# Patient Record
Sex: MALE | Race: WHITE
[De-identification: names, ages, dates, MRNs, and addresses within clinical notes are randomized per-mention and may not be internally consistent; named-entity substitution may affect disease eponyms.]

---

## 2017-03-14 ENCOUNTER — HOSPITAL ENCOUNTER (OUTPATIENT)
Dept: HOSPITAL 25 - OREAST | Age: 62
Discharge: HOME | End: 2017-03-14
Attending: OPHTHALMOLOGY
Payer: COMMERCIAL

## 2017-03-14 VITALS — DIASTOLIC BLOOD PRESSURE: 81 MMHG | SYSTOLIC BLOOD PRESSURE: 121 MMHG

## 2017-03-14 DIAGNOSIS — E78.4: ICD-10-CM

## 2017-03-14 DIAGNOSIS — K21.9: ICD-10-CM

## 2017-03-14 DIAGNOSIS — E04.9: ICD-10-CM

## 2017-03-14 DIAGNOSIS — F17.290: ICD-10-CM

## 2017-03-14 DIAGNOSIS — Z86.73: ICD-10-CM

## 2017-03-14 DIAGNOSIS — N40.0: ICD-10-CM

## 2017-03-14 DIAGNOSIS — G47.33: ICD-10-CM

## 2017-03-14 DIAGNOSIS — Z88.8: ICD-10-CM

## 2017-03-14 DIAGNOSIS — F52.21: ICD-10-CM

## 2017-03-14 DIAGNOSIS — L57.0: ICD-10-CM

## 2017-03-14 DIAGNOSIS — H25.13: Primary | ICD-10-CM

## 2017-03-14 DIAGNOSIS — H25.041: ICD-10-CM

## 2017-03-14 PROCEDURE — V2632 POST CHMBR INTRAOCULAR LENS: HCPCS

## 2017-03-14 NOTE — OP
DATE OF OPERATION:  03/14/17 [facility]

 

DATE OF BIRTH:  [birth year]

 

SURGEON:  Dr. Kwaku Rosales.

 

ASSISTANT:  None.



ANESTHESIOLOGIST:  Aaron Hernández MD

 

ANESTHESIA:  Topical with intravenous sedation.

 

PRE-OP DIAGNOSIS:  Cataract, right eye.

 

POST-OP DIAGNOSIS:  Cataract, right eye.

 

OPERATIVE PROCEDURE:  Phacoemulsification and cataract extraction with 
posterior chamber intraocular lens implant, right eye.

 

COMPLICATIONS:  None.

 

BLOOD LOSS:  None.

 

DESCRIPTION OF PROCEDURE:  The patient was brought to the operating room and 
received a small amount of intravenous sedation.  A drop of tetracaine was 
placed in his right eye.  He was prepped and draped in the usual sterile 
fashion for ophthalmic surgery and attention was directed to the right eye 
where a speculum was placed.  A paracentesis was created at the 11 o'clock 
position and 0.1 cc of 1 percent preservative-free lidocaine was injected into 
the anterior chamber followed by DisCoVisc. The eye was digitally stabilized 
while a 2.75 mm keratome was used to create a triplanar clear corneal incision 
at the 9 o'clock position.  A continuous curvilinear capsulorrhexis was created 
with a cystotome and Utrata forceps.  BSS on a cannula was used to hydrodissect 
the lens from the capsule. Phacoemulsification was performed in a divide-and-
conquer technique to create four fragments which were removed.  Residual 
cortical material was removed with irrigation and aspiration.  DisCoVisc was 
used to inflate the capsular bag and an AU00T0 19.5 diopter lens was folded and 
inserted into the capsular bag.  DisCoVisc was removed using irrigation and 
aspiration.  BSS on a cannula was used to hydrate the corneal stroma and seal 
the wound.  At the end of the case the pupil was round and the lens was 
centered.  The eye was of normal pressure and the wound was water tight.  The 
speculum was removed and topical Maxitrol ointment was placed on the surface of 
the eye.  The eye was closed, patched and shielded and the patient was sent to 
the recovery room in stable condition with post operative instructions and 
follow-up appointment given.

 

 [id number]/[id number] #: [id number]

MTDD

## 2018-10-03 ENCOUNTER — HOSPITAL ENCOUNTER (EMERGENCY)
Dept: HOSPITAL 25 - UCEAST | Age: 63
Discharge: HOME | End: 2018-10-03
Payer: COMMERCIAL

## 2018-10-03 VITALS — SYSTOLIC BLOOD PRESSURE: 132 MMHG | DIASTOLIC BLOOD PRESSURE: 82 MMHG

## 2018-10-03 DIAGNOSIS — Z87.891: ICD-10-CM

## 2018-10-03 DIAGNOSIS — S63.591A: Primary | ICD-10-CM
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
## 2018-10-03 DIAGNOSIS — Z88.8: ICD-10-CM
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
## 2018-10-03 PROCEDURE — 99212 OFFICE O/P EST SF 10 MIN: CPT

## 2018-10-03 PROCEDURE — G0463 HOSPITAL OUTPT CLINIC VISIT: HCPCS

## 2018-10-03 NOTE — RAD
INDICATION:  Right wrist injury.



TECHNIQUE: 3 views of the right wrist were obtained.



FINDINGS:  There is widening of the scapholunate joint space. No fracture is seen. There

is slight dorsal tilting of the lunate bone.



IMPRESSION:  

1. NO FRACTURE IS SEEN.

2. THERE IS WIDENING OF THE SCAPHOLUNATE JOINT SPACE SUGGESTIVE OF A SCAPHOLUNATE LIGAMENT

TEAR. THIS COULD BE FURTHER EVALUATED WITH AN MRI OF THE WRIST.

## 2018-10-03 NOTE — UC
Hand/Wrist HPI





- HPI Summary


HPI Summary: 





The patient is a 63-year-old male who fell and injured his right wrist 

yesterday.  He is right hand dominant.  He has never injured his right wrist in 

the past.  He has minimal pain at rest.  His pain markedly increases with 

dorsiflexion of the wrist.





- History Of Current Complaint


Chief Complaint: UCUpperExtremity


Stated Complaint: WRIST INJURY


Time Seen by Provider: 10/03/18 09:42


Hx Obtained From: Patient


Onset/Duration: Sudden Onset, Lasting Hours


Severity Initially: Severe


Severity Currently: Mild


Pain Intensity: 3 - 8 with movement


Pain Scale Used: 0-10 Numeric


Character Of Pain: Aching


Aggravating Factor(s): Movement


Alleviating Factor(s): Rest


Associated Signs And Symptoms: Positive: Swelling


Related History: Dominant Hand Right


Hands: 


  __________________________














  __________________________





 1 - tender/swollen, no snuff box tenderness








- Allergies/Home Medications


Allergies/Adverse Reactions: 


 Allergies











Allergy/AdvReac Type Severity Reaction Status Date / Time


 


phenobarbital AdvReac Mild "frozen Verified 10/03/18 08:59





   shoulder  





   syndrome"  











Home Medications: 


 Home Medications





NK [No Home Medications Reported]  10/03/18 [History Confirmed 10/03/18]











PMH/Surg Hx/FS Hx/Imm Hx


Previously Healthy: Yes


Neurological History: Other


Other Neurological History: AVM





- Surgical History


Surgical History: Yes


Surgery Procedure, Year, and Place: avm REMOVED 2001.  inguinal HERNIA REPAIR 

1982.  tonsillectomy as baby





- Family History


Known Family History: Positive: Cardiac Disease, Hypertension





- Social History


Alcohol Use: Daily


Alcohol Amount: 4-5 wine per day


Substance Use Type: None


Smoking Status (MU): Former Smoker


Type: Cigars


Amount Used/How Often: daily


Have You Smoked in the Last Year: Yes - Occassional cigar.


When Did the Patient Quit Smoking/Using Tobacco: quits every 6 months - only 

smokes a pipe





Review of Systems


Constitutional: Negative


Skin: Negative


Eyes: Negative


ENT: Negative


Respiratory: Negative


Cardiovascular: Negative


Gastrointestinal: Negative


Genitourinary: Negative


Motor: Negative


Neurovascular: Negative


Musculoskeletal: Arthralgia


Neurological: Negative


Psychological: Negative


All Other Systems Reviewed And Are Negative: Yes





Physical Exam


Triage Information Reviewed: Yes


Appearance: Well-Appearing, No Pain Distress, Well-Nourished


Vital Signs: 


 Initial Vital Signs











Temp  97.5 F   10/03/18 08:55


 


Pulse  78   10/03/18 08:55


 


Resp  14   10/03/18 08:55


 


BP  132/82   10/03/18 08:55


 


Pulse Ox  97   10/03/18 08:55











Eyes: Positive: Conjunctiva Clear


ENT: Positive: Hearing grossly normal.  Negative: Nasal congestion, Nasal 

drainage, Trismus, Muffled voice, Hoarse voice


Neck: Positive: Supple


Respiratory: Positive: Lungs clear, Normal breath sounds, No respiratory 

distress


Cardiovascular: Positive: RRR, No Murmur


Musculoskeletal: Positive: ROM Limited @ - dorsum right wristright wrist 

limited dorsiflexion, Edema @


Neurological: Positive: Alert


Psychological Exam: Normal


Skin Exam: Normal





Diagnostics





- Radiology


  ** No standard instances


Xray Interpretation: Positive (See Comments) - 1. NO FRACTURE IS SEEN. 2. THERE 

IS WIDENING OF THE SCAPHOLUNATE JOINT SPACE SUGGESTIVE OF A SCAPHOLUNATE 

LIGAMENT TEAR. THIS COULD BE FURTHER EVALUATED WITH AN MRI OF THE [facility]


Radiology Interpretation Completed By: Radiologist





Hand/Wrist Course/Dx





- Differential Dx/Diagnosis


Provider Diagnoses: right wrist scapho-lunate ligament tear





Discharge





- Sign-Out/Discharge


Documenting (check all that apply): Patient Departure


All imaging exams completed and their final reports reviewed: Yes





- Discharge Plan


Condition: Stable


Disposition: HOME


Patient Education Materials:  Wrist Sprain (ED)


Referrals: 


Christian Burkett MD [Medical Doctor] - As Soon As Possible


Additional Instructions: 


your XR and examine are consistent with a scapho-lunate ligament tear





you need  to see an orthopedist for follow up





splint





tylenol or advil for pain





- Billing Disposition and Condition


Condition: STABLE


Disposition: Home

## 2018-11-27 ENCOUNTER — HOSPITAL ENCOUNTER (OUTPATIENT)
Dept: HOSPITAL 25 - OREAST | Age: 63
Discharge: HOME | End: 2018-11-27
Attending: OPHTHALMOLOGY
Payer: MEDICARE

## 2018-11-27 VITALS — SYSTOLIC BLOOD PRESSURE: 125 MMHG | DIASTOLIC BLOOD PRESSURE: 88 MMHG

## 2018-11-27 DIAGNOSIS — Z86.73: ICD-10-CM

## 2018-11-27 DIAGNOSIS — G47.33: ICD-10-CM

## 2018-11-27 DIAGNOSIS — H25.042: Primary | ICD-10-CM

## 2018-11-27 DIAGNOSIS — K21.9: ICD-10-CM

## 2018-11-27 DIAGNOSIS — E78.5: ICD-10-CM

## 2018-11-27 PROCEDURE — V2632 POST CHMBR INTRAOCULAR LENS: HCPCS

## 2018-11-28 NOTE — OP
DATE OF OPERATION:  11/27/18 [facility]

 

DATE OF BIRTH:  [birth year].

 

SURGEON:  Dr. Kwaku Rosales.

 

ASSISTANT:  None.

 

ANESTHESIA:  Topical with intravenous sedation.

 

PRE-OP DIAGNOSIS:  Cataract, left eye.

 

POST-OP DIAGNOSIS:  Cataract, left eye.

 

OPERATIVE PROCEDURE: Phacoemulsification and cataract extraction with posterior 
chamber intraocular lens implant, left eye.

 

COMPLICATIONS:  None.

 

BLOOD LOSS:  None.

 

DESCRIPTION OF PROCEDURE:  The patient was brought to the operating room and 
received a small amount of intravenous sedation.  A drop of Tetracaine was 
placed in his left eye.  He was prepped and draped in the usual sterile fashion 
for ophthalmic surgery and attention was directed to the left eye where a 
speculum was placed.  A paracentesis was created at the 5 o'clock position and 
0.1 cc of 1 percent preservative-free Lidocaine was injected into the anterior 
chamber followed by DisCoVisc.  The eye was digitally stabilized while a 2.75 
mm keratome was used to create a triplanar clear corneal incision at the 3 o'
clock position.  A continuous curvilinear capsulorrhexis was created with a 
cystotome and Utrata forceps.  BSS on a cannula was used to hydrodissect the 
lens from the capsule.  Phacoemulsification was performed in a divide-and-
conquer technique to create four fragments which were removed.  Residual 
cortical material was removed with irrigation and aspiration. DisCoVisc was 
used to inflate the capsular bag and an AU00T0 20.5 diopter lens was folded and 
inserted into the capsular bag.  DisCoVisc was removed using irrigation and 
aspiration.  BSS on a cannula was used to hydrate the corneal stroma and seal 
the wound.  At the end of the case the pupil was round and the lens was 
centered. The eye was of normal pressure and the wound was water tight.  The 
speculum was removed and topical Maxitrol ointment was placed on the surface of 
the eye. The eye was closed, patched and shielded and the patient was sent to 
the recovery room in stable condition with post operative instructions and 
followup appointment given.

 

 [id number]/CPS #: [id number]

MTDD